# Patient Record
Sex: MALE | Race: WHITE | NOT HISPANIC OR LATINO
[De-identification: names, ages, dates, MRNs, and addresses within clinical notes are randomized per-mention and may not be internally consistent; named-entity substitution may affect disease eponyms.]

---

## 2021-01-31 ENCOUNTER — FORM ENCOUNTER (OUTPATIENT)
Age: 61
End: 2021-01-31

## 2021-02-01 PROBLEM — Z00.00 ENCOUNTER FOR PREVENTIVE HEALTH EXAMINATION: Status: ACTIVE | Noted: 2021-02-01

## 2021-02-03 ENCOUNTER — APPOINTMENT (OUTPATIENT)
Dept: DISASTER EMERGENCY | Facility: HOSPITAL | Age: 61
End: 2021-02-03

## 2021-02-03 ENCOUNTER — OUTPATIENT (OUTPATIENT)
Dept: OUTPATIENT SERVICES | Facility: HOSPITAL | Age: 61
LOS: 1 days | End: 2021-02-03
Payer: COMMERCIAL

## 2021-02-03 VITALS
TEMPERATURE: 98 F | OXYGEN SATURATION: 100 % | WEIGHT: 185.19 LBS | HEIGHT: 74 IN | SYSTOLIC BLOOD PRESSURE: 157 MMHG | RESPIRATION RATE: 18 BRPM | HEART RATE: 83 BPM | DIASTOLIC BLOOD PRESSURE: 101 MMHG

## 2021-02-03 VITALS
TEMPERATURE: 98 F | RESPIRATION RATE: 18 BRPM | HEART RATE: 74 BPM | DIASTOLIC BLOOD PRESSURE: 95 MMHG | SYSTOLIC BLOOD PRESSURE: 160 MMHG | OXYGEN SATURATION: 99 %

## 2021-02-03 DIAGNOSIS — U07.1 COVID-19: ICD-10-CM

## 2021-02-03 PROCEDURE — M0239: CPT

## 2021-02-03 RX ORDER — SODIUM CHLORIDE 9 MG/ML
250 INJECTION INTRAMUSCULAR; INTRAVENOUS; SUBCUTANEOUS
Refills: 0 | Status: DISCONTINUED | OUTPATIENT
Start: 2021-02-03 | End: 2021-02-17

## 2021-02-03 RX ORDER — BAMLANIVIMAB 35 MG/ML
700 INJECTION, SOLUTION INTRAVENOUS ONCE
Refills: 0 | Status: COMPLETED | OUTPATIENT
Start: 2021-02-03 | End: 2021-02-03

## 2021-02-03 RX ADMIN — BAMLANIVIMAB 270 MILLIGRAM(S): 35 INJECTION, SOLUTION INTRAVENOUS at 11:40

## 2021-02-03 RX ADMIN — SODIUM CHLORIDE 25 MILLILITER(S): 9 INJECTION INTRAMUSCULAR; INTRAVENOUS; SUBCUTANEOUS at 12:40

## 2021-02-03 NOTE — CHART NOTE - NSCHARTNOTEFT_GEN_A_CORE
CC: Monoclonal Antibody Infusion/COVID 19 Positive  61y/o Male with PMHx of HTN and symptoms that include cough, malaise, congestion, chills, body aches, fatigue    exam/findings:  T(C): 36.8 (02-03-21 @ 11:35), Max: 36.8 (02-03-21 @ 11:35)  HR: 78 (02-03-21 @ 11:35) (78 - 83)  BP: 139/87 (02-03-21 @ 11:35) (139/87 - 157/101)  RR: 18 (02-03-21 @ 11:35) (18 - 18)  SpO2: 100% (02-03-21 @ 11:35) (100% - 100%)      PE:   Appearance: NAD	  HEENT:   Normal oral mucosa,   Cardiovascular: Normal S1 S2, No JVD, No murmurs, No edema  Respiratory: Lungs clear to auscultation	  Gastrointestinal:  Soft, Non-tender, + BS	  Skin: warm and dry  Neurologic: Non-focal  Extremities: Normal range of motion, no calf tenderness or edema    ASSESSMENT:  Pt is a 61 y/o M who tested Covid 19 Positive on 1/28/21 and symptoms began on 1/26/21,   referred by Dr. Dos Santos who presents to infusion center for Monoclonal antibody infusion Bamlanivimab  Symptoms/ Criteria:  symptoms that include cough, malaise, congestion, chills, body aches, fatigue    Risk Profile includes: 61y/o Male with PMHx of HTN       PLAN:  - infusion procedure explained to patient   -Consent for monoclonal antibody infusion obtained   - Risk & benifits discussed/all questions answered  -infuse   Bamlanivimab 700mg IV over one hour   -observe patient for one hour post infusion        I have reviewed the Bamlanivimab Emergency Use Authorization (EAU) and I have provided the patient or patient's caregiver with the following information:  1. FDA has authorized emergency use of Bamlanivimab, which is not FDA-approved biologic product.  2. The patient or patient's caregiver has the option to accept or refuse administration of Bamlanivimab  3. The significant known and benefits are unknown.  4. Information on available alternative treatments and risks and benefits of those alternatives.    Discharge:  Patient tolerated infusion well denies complaints of chest pain/SOB/dizziness/ palps  Vital signs --- for discharge home ---  D/C instructions given/ fact sheet included.  Patient to follow-up with PCP as needed. CC: Monoclonal Antibody Infusion/COVID 19 Positive  61y/o Male with PMHx of HTN and symptoms that include cough, malaise, congestion, chills, body aches, fatigue    exam/findings:  T(C): 36.8 (02-03-21 @ 11:35), Max: 36.8 (02-03-21 @ 11:35)  HR: 78 (02-03-21 @ 11:35) (78 - 83)  BP: 139/87 (02-03-21 @ 11:35) (139/87 - 157/101)  RR: 18 (02-03-21 @ 11:35) (18 - 18)  SpO2: 100% (02-03-21 @ 11:35) (100% - 100%)      PE:   Appearance: NAD	  HEENT:   Normal oral mucosa,   Cardiovascular: Normal S1 S2, No JVD, No murmurs, No edema  Respiratory: Lungs clear to auscultation	  Gastrointestinal:  Soft, Non-tender, + BS	  Skin: warm and dry  Neurologic: Non-focal  Extremities: Normal range of motion, no calf tenderness or edema    ASSESSMENT:  Pt is a 59 y/o M who tested Covid 19 Positive on 1/28/21 and symptoms began on 1/26/21,   referred by Dr. Dos Santos who presents to infusion center for Monoclonal antibody infusion Bamlanivimab  Symptoms/ Criteria:  symptoms that include cough, malaise, congestion, chills, body aches, fatigue    Risk Profile includes: 61y/o Male with PMHx of HTN       PLAN:  - infusion procedure explained to patient   -Consent for monoclonal antibody infusion obtained   - Risk & benifits discussed/all questions answered  -infuse   Bamlanivimab 700mg IV over one hour   -observe patient for one hour post infusion        I have reviewed the Bamlanivimab Emergency Use Authorization (EAU) and I have provided the patient or patient's caregiver with the following information:  1. FDA has authorized emergency use of Bamlanivimab, which is not FDA-approved biologic product.  2. The patient or patient's caregiver has the option to accept or refuse administration of Bamlanivimab  3. The significant known and benefits are unknown.  4. Information on available alternative treatments and risks and benefits of those alternatives.    Discharge:  Patient tolerated infusion well denies complaints of chest pain/SOB/dizziness/ palps  Vital signs stable for discharge home 13:45  D/C instructions given/ fact sheet included.  Patient to follow-up with PCP as needed.

## 2021-02-04 ENCOUNTER — TRANSCRIPTION ENCOUNTER (OUTPATIENT)
Age: 61
End: 2021-02-04

## 2021-02-05 ENCOUNTER — TRANSCRIPTION ENCOUNTER (OUTPATIENT)
Age: 61
End: 2021-02-05

## 2021-02-09 ENCOUNTER — TRANSCRIPTION ENCOUNTER (OUTPATIENT)
Age: 61
End: 2021-02-09